# Patient Record
Sex: FEMALE | Race: WHITE | Employment: FULL TIME | URBAN - METROPOLITAN AREA
[De-identification: names, ages, dates, MRNs, and addresses within clinical notes are randomized per-mention and may not be internally consistent; named-entity substitution may affect disease eponyms.]

---

## 2024-01-10 ENCOUNTER — HOSPITAL ENCOUNTER (OUTPATIENT)
Age: 54
Setting detail: SPECIMEN
Discharge: HOME | End: 2024-01-10

## 2024-01-10 LAB
ALBUMIN SERPL-MCNC: 4.2 G/DL (ref 3.5–5.2)
ALBUMIN/GLOB SERPL: 1.6 {RATIO} (ref 1–2.5)
ALP SERPL-CCNC: 36 U/L (ref 35–104)
ALT SERPL-CCNC: 15 U/L (ref 5–33)
ANION GAP SERPL CALCULATED.3IONS-SCNC: 10 MMOL/L (ref 9–17)
AST SERPL-CCNC: 16 U/L
BASOPHILS # BLD: 0.05 K/UL (ref 0–0.2)
BASOPHILS NFR BLD: 1 % (ref 0–2)
BILIRUB SERPL-MCNC: 0.4 MG/DL (ref 0.3–1.2)
BUN SERPL-MCNC: 8 MG/DL (ref 6–20)
CALCIUM SERPL-MCNC: 9.1 MG/DL (ref 8.6–10.4)
CHLORIDE SERPL-SCNC: 101 MMOL/L (ref 98–107)
CHOLEST SERPL-MCNC: 217 MG/DL
CHOLESTEROL/HDL RATIO: 3.6
CO2 SERPL-SCNC: 25 MMOL/L (ref 20–31)
CREAT SERPL-MCNC: 0.5 MG/DL (ref 0.5–0.9)
EOSINOPHIL # BLD: 0.26 K/UL (ref 0–0.44)
EOSINOPHILS RELATIVE PERCENT: 4 % (ref 1–4)
ERYTHROCYTE [DISTWIDTH] IN BLOOD BY AUTOMATED COUNT: 12 % (ref 11.8–14.4)
EST. AVERAGE GLUCOSE BLD GHB EST-MCNC: 88 MG/DL
GFR SERPL CREATININE-BSD FRML MDRD: >60 ML/MIN/1.73M2
GLUCOSE SERPL-MCNC: 76 MG/DL (ref 70–99)
HBA1C MFR BLD: 4.7 % (ref 4–6)
HCT VFR BLD AUTO: 41.4 % (ref 36.3–47.1)
HDLC SERPL-MCNC: 60 MG/DL
HGB BLD-MCNC: 14 G/DL (ref 11.9–15.1)
IMM GRANULOCYTES # BLD AUTO: <0.03 K/UL (ref 0–0.3)
IMM GRANULOCYTES NFR BLD: 0 %
LDLC SERPL CALC-MCNC: 142 MG/DL (ref 0–130)
LYMPHOCYTES NFR BLD: 1.89 K/UL (ref 1.1–3.7)
LYMPHOCYTES RELATIVE PERCENT: 25 % (ref 24–43)
MCH RBC QN AUTO: 31 PG (ref 25.2–33.5)
MCHC RBC AUTO-ENTMCNC: 33.8 G/DL (ref 28.4–34.8)
MCV RBC AUTO: 91.8 FL (ref 82.6–102.9)
MONOCYTES NFR BLD: 0.73 K/UL (ref 0.1–1.2)
MONOCYTES NFR BLD: 10 % (ref 3–12)
NEUTROPHILS NFR BLD: 60 % (ref 36–65)
NEUTS SEG NFR BLD: 4.49 K/UL (ref 1.5–8.1)
NRBC BLD-RTO: 0 PER 100 WBC
PLATELET # BLD AUTO: 283 K/UL (ref 138–453)
PMV BLD AUTO: 9.8 FL (ref 8.1–13.5)
POTASSIUM SERPL-SCNC: 4.1 MMOL/L (ref 3.7–5.3)
PROT SERPL-MCNC: 6.9 G/DL (ref 6.4–8.3)
RBC # BLD AUTO: 4.51 M/UL (ref 3.95–5.11)
SODIUM SERPL-SCNC: 136 MMOL/L (ref 135–144)
TRIGL SERPL-MCNC: 76 MG/DL
TSH SERPL DL<=0.05 MIU/L-ACNC: 1.41 UIU/ML (ref 0.3–5)
WBC OTHER # BLD: 7.4 K/UL (ref 3.5–11.3)

## 2024-07-16 ENCOUNTER — APPOINTMENT (OUTPATIENT)
Dept: GENERAL RADIOLOGY | Age: 54
End: 2024-07-16
Payer: COMMERCIAL

## 2024-07-16 ENCOUNTER — HOSPITAL ENCOUNTER (EMERGENCY)
Age: 54
Discharge: HOME | End: 2024-07-16
Attending: EMERGENCY MEDICINE
Payer: COMMERCIAL

## 2024-07-16 VITALS
WEIGHT: 220.46 LBS | BODY MASS INDEX: 36.73 KG/M2 | DIASTOLIC BLOOD PRESSURE: 107 MMHG | RESPIRATION RATE: 18 BRPM | HEART RATE: 60 BPM | TEMPERATURE: 98.3 F | HEIGHT: 65 IN | OXYGEN SATURATION: 97 % | SYSTOLIC BLOOD PRESSURE: 188 MMHG

## 2024-07-16 DIAGNOSIS — S93.402A SPRAIN OF LEFT ANKLE, UNSPECIFIED LIGAMENT, INITIAL ENCOUNTER: Primary | ICD-10-CM

## 2024-07-16 PROCEDURE — 73590 X-RAY EXAM OF LOWER LEG: CPT

## 2024-07-16 PROCEDURE — 99283 EMERGENCY DEPT VISIT LOW MDM: CPT

## 2024-07-16 PROCEDURE — 73630 X-RAY EXAM OF FOOT: CPT

## 2024-07-16 PROCEDURE — 6370000000 HC RX 637 (ALT 250 FOR IP): Performed by: EMERGENCY MEDICINE

## 2024-07-16 RX ORDER — HYDROCODONE BITARTRATE AND ACETAMINOPHEN 5; 325 MG/1; MG/1
1 TABLET ORAL ONCE
Status: COMPLETED | OUTPATIENT
Start: 2024-07-16 | End: 2024-07-16

## 2024-07-16 RX ORDER — HYDROCODONE BITARTRATE AND ACETAMINOPHEN 5; 325 MG/1; MG/1
1 TABLET ORAL EVERY 6 HOURS PRN
Qty: 12 TABLET | Refills: 0 | Status: SHIPPED | OUTPATIENT
Start: 2024-07-16 | End: 2024-07-19

## 2024-07-16 RX ADMIN — HYDROCODONE BITARTRATE AND ACETAMINOPHEN 1 TABLET: 5; 325 TABLET ORAL at 18:09

## 2024-07-16 ASSESSMENT — LIFESTYLE VARIABLES
HOW OFTEN DO YOU HAVE A DRINK CONTAINING ALCOHOL: 2-4 TIMES A MONTH
HOW MANY STANDARD DRINKS CONTAINING ALCOHOL DO YOU HAVE ON A TYPICAL DAY: 1 OR 2

## 2024-07-16 ASSESSMENT — PAIN SCALES - GENERAL
PAINLEVEL_OUTOF10: 6
PAINLEVEL_OUTOF10: 4

## 2024-07-16 ASSESSMENT — PAIN DESCRIPTION - LOCATION: LOCATION: LEG

## 2024-07-16 ASSESSMENT — PAIN DESCRIPTION - PAIN TYPE: TYPE: ACUTE PAIN

## 2024-07-16 ASSESSMENT — PAIN DESCRIPTION - DESCRIPTORS: DESCRIPTORS: ACHING

## 2024-07-16 ASSESSMENT — PAIN DESCRIPTION - ORIENTATION: ORIENTATION: LEFT

## 2024-07-16 ASSESSMENT — PAIN - FUNCTIONAL ASSESSMENT: PAIN_FUNCTIONAL_ASSESSMENT: 0-10

## 2024-07-16 NOTE — DISCHARGE INSTRUCTIONS
Rest, ice, and elevate the leg.  Use the Aircast and crutches at all times.  Follow-up with the orthopedic surgeon in the morning for reevaluation.  Return to the emergency department with any problems or concerns as discussed.

## 2024-07-16 NOTE — ED PROVIDER NOTES
patient understands that at this time there is no evidence for a more malignant underlying process, but also understands that early in the process of an illness or injury, an emergency department workup can be falsely reassuring.  Routine discharge counseling was given, and it is understood that worsening, changing or persistent symptoms should prompt an immediate call or follow up with their primary physician or return to the emergency department. The importance of appropriate follow up was also discussed.  I have reviewed the disposition diagnosis.  I have answered the questions and given discharge instructions.  There was voiced understanding of these instructions and no further questions or complaints.    FINAL IMPRESSION      1. Sprain of left ankle, unspecified ligament, initial encounter          DISPOSITION/PLAN   DISPOSITION Decision To Discharge 07/16/2024 06:55:48 PM        CONDITION ON DISPOSITION: STABLE       PATIENT REFERRED TO:  Fiorella Youssef MD  [address]  [facility] [address]  [facility] [address]  [phone]    Call in 1 day        DISCHARGE MEDICATIONS:  Discharge Medication List as of 7/16/2024  7:02 PM        START taking these medications    Details   HYDROcodone-acetaminophen (NORCO) 5-325 MG per tablet Take 1 tablet by mouth every 6 hours as needed for Pain for up to 3 days. Intended supply: 3 days. Take lowest dose possible to manage pain Max Daily Amount: 4 tablets, Disp-12 tablet, R-0Normal             (Please note that portions of this note were completed with a voice recognition program.  Efforts were made to edit the dictations but occasionally words are mis-transcribed.  Additionally, portions of this note may also include information that was incorporated after care transfer to another provider that were not available at the time of my evaluation.  Some of this information could likely include laboratory values, vital sign updates, medications etc.)    Micki Lui DO   Attending

## 2024-07-18 ENCOUNTER — OFFICE VISIT (OUTPATIENT)
Dept: ORTHOPEDIC SURGERY | Age: 54
End: 2024-07-18
Payer: COMMERCIAL

## 2024-07-18 VITALS — BODY MASS INDEX: 36.65 KG/M2 | HEIGHT: 65 IN | WEIGHT: 220 LBS

## 2024-07-18 DIAGNOSIS — S93.492A SPRAIN OF ANTERIOR TALOFIBULAR LIGAMENT OF LEFT ANKLE, INITIAL ENCOUNTER: Primary | ICD-10-CM

## 2024-07-18 PROCEDURE — 1036F TOBACCO NON-USER: CPT | Performed by: PHYSICIAN ASSISTANT

## 2024-07-18 PROCEDURE — G8417 CALC BMI ABV UP PARAM F/U: HCPCS | Performed by: PHYSICIAN ASSISTANT

## 2024-07-18 PROCEDURE — 3017F COLORECTAL CA SCREEN DOC REV: CPT | Performed by: PHYSICIAN ASSISTANT

## 2024-07-18 PROCEDURE — 99203 OFFICE O/P NEW LOW 30 MIN: CPT | Performed by: PHYSICIAN ASSISTANT

## 2024-07-18 PROCEDURE — G8427 DOCREV CUR MEDS BY ELIG CLIN: HCPCS | Performed by: PHYSICIAN ASSISTANT

## 2024-07-18 RX ORDER — ALBUTEROL SULFATE 90 UG/1
2 AEROSOL, METERED RESPIRATORY (INHALATION) EVERY 4 HOURS PRN
COMMUNITY
Start: 2022-08-02

## 2024-07-18 NOTE — PROGRESS NOTES
Patient ID: Bobbi Child is a 53 y.o. female    Chief Compliant:  Chief Complaint   Patient presents with    Foot Injury     left      HPI:  This is a 53 y.o. female who presents to the clinic today for evaluation of left leg pain and acute ankle sprain and swelling.  Patient fell down about 2 steps when she was coming out of her RV 4 days ago.  Patient sustained a inversion injury she did go to the urgent care initially when this happened she had x-rays of the ankle that were negative she then went to the emergency room 2 days later as she was experiencing significant swelling and pain in the foot that radiated up to her anterior mid tibia.  She states that she has been had a hard time placing weight on her left ankle.  She has been taking ibuprofen and Tylenol as well as taking the Norco that she was given in the emergency room with minimal relief.  She has been elevating the leg as well as applying ice which does seem to help.  She denies any radiculopathy, paresthesias or weakness of her leg..       Review of Systems   All other systems reviewed and are negative.    Past History:    Current Outpatient Medications:     albuterol sulfate HFA (PROVENTIL;VENTOLIN;PROAIR) 108 (90 Base) MCG/ACT inhaler, Inhale 2 puffs into the lungs every 4 hours as needed, Disp: , Rfl:     HYDROcodone-acetaminophen (NORCO) 5-325 MG per tablet, Take 1 tablet by mouth every 6 hours as needed for Pain for up to 3 days. Intended supply: 3 days. Take lowest dose possible to manage pain Max Daily Amount: 4 tablets, Disp: 12 tablet, Rfl: 0  No Known Allergies  Social History     Socioeconomic History    Marital status:      Spouse name: Not on file    Number of children: Not on file    Years of education: Not on file    Highest education level: Not on file   Occupational History    Not on file   Tobacco Use    Smoking status: Never    Smokeless tobacco: Never   Substance and Sexual Activity    Alcohol use: Not on file    Drug

## 2024-08-01 ENCOUNTER — OFFICE VISIT (OUTPATIENT)
Dept: ORTHOPEDIC SURGERY | Age: 54
End: 2024-08-01
Payer: COMMERCIAL

## 2024-08-01 VITALS — BODY MASS INDEX: 36.65 KG/M2 | HEIGHT: 65 IN | WEIGHT: 220 LBS

## 2024-08-01 DIAGNOSIS — S93.492A SPRAIN OF ANTERIOR TALOFIBULAR LIGAMENT OF LEFT ANKLE, INITIAL ENCOUNTER: Primary | ICD-10-CM

## 2024-08-01 PROCEDURE — G8417 CALC BMI ABV UP PARAM F/U: HCPCS | Performed by: PHYSICIAN ASSISTANT

## 2024-08-01 PROCEDURE — 3017F COLORECTAL CA SCREEN DOC REV: CPT | Performed by: PHYSICIAN ASSISTANT

## 2024-08-01 PROCEDURE — G8427 DOCREV CUR MEDS BY ELIG CLIN: HCPCS | Performed by: PHYSICIAN ASSISTANT

## 2024-08-01 PROCEDURE — 1036F TOBACCO NON-USER: CPT | Performed by: PHYSICIAN ASSISTANT

## 2024-08-01 PROCEDURE — 99213 OFFICE O/P EST LOW 20 MIN: CPT | Performed by: PHYSICIAN ASSISTANT

## 2024-08-01 RX ORDER — METHYLPREDNISOLONE 4 MG/1
4 TABLET ORAL SEE ADMIN INSTRUCTIONS
Qty: 1 KIT | Refills: 0 | Status: SHIPPED | OUTPATIENT
Start: 2024-08-01 | End: 2024-08-07

## 2024-08-01 RX ORDER — IBUPROFEN 200 MG
200 TABLET ORAL EVERY 6 HOURS PRN
COMMUNITY

## 2024-08-01 RX ORDER — ACETAMINOPHEN 500 MG
500 TABLET ORAL EVERY 6 HOURS PRN
COMMUNITY

## 2024-08-01 NOTE — PROGRESS NOTES
Patient ID: Bobbi Child is a 53 y.o. female    Chief Compliant:  Chief Complaint   Patient presents with    Ankle Injury     left      HPI:  This is a 53 y.o. female who presents to the clinic today for follow-up of acute ankle sprain.  Patient was last seen by me on 7/18/2020 for where we gave her a cam boot for a high-grade ankle sprain.  She notes that her swelling has gone down and her pain has improved slightly she still unable to completely bear weight on that foot at this time she does note 3 middle toes are slightly numb she still does have some swelling going on to that foot she has been icing as well as elevating her foot daily taking Motrin and Tylenol..       Review of Systems   All other systems reviewed and are negative.    Past History:    Current Outpatient Medications:     acetaminophen (TYLENOL) 500 MG tablet, Take 1 tablet by mouth every 6 hours as needed for Pain, Disp: , Rfl:     ibuprofen (ADVIL;MOTRIN) 200 MG tablet, Take 1 tablet by mouth every 6 hours as needed for Pain, Disp: , Rfl:     albuterol sulfate HFA (PROVENTIL;VENTOLIN;PROAIR) 108 (90 Base) MCG/ACT inhaler, Inhale 2 puffs into the lungs every 4 hours as needed, Disp: , Rfl:   No Known Allergies  Social History     Socioeconomic History    Marital status:      Spouse name: Not on file    Number of children: Not on file    Years of education: Not on file    Highest education level: Not on file   Occupational History    Not on file   Tobacco Use    Smoking status: Never    Smokeless tobacco: Never   Substance and Sexual Activity    Alcohol use: Not on file    Drug use: Not on file    Sexual activity: Not on file   Other Topics Concern    Not on file   Social History Narrative    Not on file     Social Determinants of Health     Financial Resource Strain: Not on file   Food Insecurity: Not on file   Transportation Needs: Not on file   Physical Activity: Not on file   Stress: Not on file   Social Connections: Not on file

## 2024-08-07 ENCOUNTER — TELEPHONE (OUTPATIENT)
Dept: FAMILY MEDICINE CLINIC | Age: 54
End: 2024-08-07

## 2024-08-07 NOTE — TELEPHONE ENCOUNTER
Patient came into office to drop off FMLA/STD paperwork to be completed by provider.  Paperwork has been scanned into  as well as original copy placed in provider folder.  Patient also filled out RENE and that has been scanned into .    Please fax completed forms over when complete.

## 2024-08-16 ENCOUNTER — OFFICE VISIT (OUTPATIENT)
Dept: ORTHOPEDIC SURGERY | Age: 54
End: 2024-08-16
Payer: COMMERCIAL

## 2024-08-16 VITALS — HEIGHT: 65 IN | WEIGHT: 220.02 LBS | BODY MASS INDEX: 36.66 KG/M2 | RESPIRATION RATE: 14 BRPM

## 2024-08-16 DIAGNOSIS — S93.492A SPRAIN OF ANTERIOR TALOFIBULAR LIGAMENT OF LEFT ANKLE, INITIAL ENCOUNTER: Primary | ICD-10-CM

## 2024-08-16 PROCEDURE — G8417 CALC BMI ABV UP PARAM F/U: HCPCS | Performed by: PHYSICIAN ASSISTANT

## 2024-08-16 PROCEDURE — 99213 OFFICE O/P EST LOW 20 MIN: CPT | Performed by: PHYSICIAN ASSISTANT

## 2024-08-16 PROCEDURE — G8427 DOCREV CUR MEDS BY ELIG CLIN: HCPCS | Performed by: PHYSICIAN ASSISTANT

## 2024-08-16 PROCEDURE — 3017F COLORECTAL CA SCREEN DOC REV: CPT | Performed by: PHYSICIAN ASSISTANT

## 2024-08-16 PROCEDURE — 1036F TOBACCO NON-USER: CPT | Performed by: PHYSICIAN ASSISTANT

## 2024-08-16 NOTE — PROGRESS NOTES
Patient ID: Bobbi Child is a 53 y.o. female    Chief Compliant:  Chief Complaint   Patient presents with    Ankle Pain     Left      HPI:  This is a 53 y.o. female who presents to the clinic today for ongoing left ankle pain after she sustained a fall 6 weeks ago.  Patient states that she continues to do better each week she did get a Medrol Dosepak given to her at her last office visit that states that she did find significant relief.  She also notes that the past couple days she has been able to take a few more steps on that ankle and feels like her progress has significantly improved.  Patient still denies any numbness or tingling she states that that sensation that she experienced on her last visit has resolved.  She notes that the swelling is significantly resolved as well..       Review of Systems   All other systems reviewed and are negative.    Past History:    Current Outpatient Medications:     acetaminophen (TYLENOL) 500 MG tablet, Take 1 tablet by mouth every 6 hours as needed for Pain, Disp: , Rfl:     ibuprofen (ADVIL;MOTRIN) 200 MG tablet, Take 1 tablet by mouth every 6 hours as needed for Pain, Disp: , Rfl:     albuterol sulfate HFA (PROVENTIL;VENTOLIN;PROAIR) 108 (90 Base) MCG/ACT inhaler, Inhale 2 puffs into the lungs every 4 hours as needed, Disp: , Rfl:   No Known Allergies  Social History     Socioeconomic History    Marital status:      Spouse name: Not on file    Number of children: Not on file    Years of education: Not on file    Highest education level: Not on file   Occupational History    Not on file   Tobacco Use    Smoking status: Never    Smokeless tobacco: Never   Substance and Sexual Activity    Alcohol use: Not on file    Drug use: Not on file    Sexual activity: Not on file   Other Topics Concern    Not on file   Social History Narrative    Not on file     Social Determinants of Health     Financial Resource Strain: Not on file   Food Insecurity: No Food Insecurity

## 2024-08-26 ENCOUNTER — TELEPHONE (OUTPATIENT)
Dept: ORTHOPEDIC SURGERY | Age: 54
End: 2024-08-26

## 2024-08-26 NOTE — TELEPHONE ENCOUNTER
Patient is calling to see if you received any paperwork from [employer] for her disability extension.  It was faxed on 8/19/24 and today.  Please return her call to let her know.  Thank.  It needs to be completed by 8/29/24,

## 2024-08-27 NOTE — TELEPHONE ENCOUNTER
Received request from Lory requesting office note and applicable test results from 08/17/2024 and ongoing.     Writer spoke with patient and informed request received. Writer verified with patient that Lory would like last office note from 08/16/2024 as that is when her leave was extended. Writer printed last office note and letter with extension date as requested and faxed to [phone]. Patient was notified and verbalized understanding.

## 2024-08-27 NOTE — TELEPHONE ENCOUNTER
Called patient for clarification regarding paperwork. Patient stated Lory is faxing over a request for more documentation since last office visit. Paperwork is due 08/29/2024. Writer informed will verify request from Lory received and will work on documentation for patient. Patient verbalized understanding.

## 2024-09-05 ENCOUNTER — PATIENT MESSAGE (OUTPATIENT)
Dept: ORTHOPEDIC SURGERY | Age: 54
End: 2024-09-05

## 2024-09-05 DIAGNOSIS — S93.492A SPRAIN OF ANTERIOR TALOFIBULAR LIGAMENT OF LEFT ANKLE, INITIAL ENCOUNTER: Primary | ICD-10-CM

## 2024-09-05 RX ORDER — METHYLPREDNISOLONE 4 MG
4 TABLET, DOSE PACK ORAL SEE ADMIN INSTRUCTIONS
Qty: 1 KIT | Refills: 0 | Status: SHIPPED | OUTPATIENT
Start: 2024-09-05 | End: 2024-09-11

## 2024-09-05 NOTE — TELEPHONE ENCOUNTER
Rosalia,     Please see message below and advise.  You prescribe patient a medrol dosepack on 8/1/24 for patient's left ankle sprain. Last office visit in 8/16/24 you mentioned that the medrol dosepack provided patient with significant relief.

## 2024-09-13 ENCOUNTER — OFFICE VISIT (OUTPATIENT)
Dept: ORTHOPEDIC SURGERY | Age: 54
End: 2024-09-13
Payer: COMMERCIAL

## 2024-09-13 VITALS — RESPIRATION RATE: 14 BRPM | HEIGHT: 65 IN | BODY MASS INDEX: 36.65 KG/M2 | WEIGHT: 220 LBS

## 2024-09-13 DIAGNOSIS — S93.492A SPRAIN OF ANTERIOR TALOFIBULAR LIGAMENT OF LEFT ANKLE, INITIAL ENCOUNTER: Primary | ICD-10-CM

## 2024-09-13 PROCEDURE — G8427 DOCREV CUR MEDS BY ELIG CLIN: HCPCS | Performed by: PHYSICIAN ASSISTANT

## 2024-09-13 PROCEDURE — G8417 CALC BMI ABV UP PARAM F/U: HCPCS | Performed by: PHYSICIAN ASSISTANT

## 2024-09-13 PROCEDURE — 3017F COLORECTAL CA SCREEN DOC REV: CPT | Performed by: PHYSICIAN ASSISTANT

## 2024-09-13 PROCEDURE — 99213 OFFICE O/P EST LOW 20 MIN: CPT | Performed by: PHYSICIAN ASSISTANT

## 2024-09-13 PROCEDURE — 1036F TOBACCO NON-USER: CPT | Performed by: PHYSICIAN ASSISTANT

## 2024-09-20 ENCOUNTER — HOSPITAL ENCOUNTER (OUTPATIENT)
Dept: PHYSICAL THERAPY | Facility: CLINIC | Age: 54
Setting detail: THERAPIES SERIES
Discharge: HOME | End: 2024-09-20
Attending: PHYSICIAN ASSISTANT
Payer: COMMERCIAL

## 2024-09-20 PROCEDURE — 97110 THERAPEUTIC EXERCISES: CPT

## 2024-09-20 PROCEDURE — 97161 PT EVAL LOW COMPLEX 20 MIN: CPT

## 2024-09-20 PROCEDURE — 97016 VASOPNEUMATIC DEVICE THERAPY: CPT

## 2024-09-24 ENCOUNTER — HOSPITAL ENCOUNTER (OUTPATIENT)
Dept: PHYSICAL THERAPY | Facility: CLINIC | Age: 54
Setting detail: THERAPIES SERIES
Discharge: HOME | End: 2024-09-24
Attending: PHYSICIAN ASSISTANT
Payer: COMMERCIAL

## 2024-09-24 PROCEDURE — 97110 THERAPEUTIC EXERCISES: CPT

## 2024-09-24 PROCEDURE — 97016 VASOPNEUMATIC DEVICE THERAPY: CPT

## 2024-09-26 ENCOUNTER — HOSPITAL ENCOUNTER (OUTPATIENT)
Dept: PHYSICAL THERAPY | Facility: CLINIC | Age: 54
Setting detail: THERAPIES SERIES
Discharge: HOME | End: 2024-09-26
Attending: PHYSICIAN ASSISTANT
Payer: COMMERCIAL

## 2024-09-26 PROCEDURE — 97110 THERAPEUTIC EXERCISES: CPT

## 2024-09-26 PROCEDURE — 97016 VASOPNEUMATIC DEVICE THERAPY: CPT

## 2024-09-30 ENCOUNTER — HOSPITAL ENCOUNTER (OUTPATIENT)
Dept: PHYSICAL THERAPY | Facility: CLINIC | Age: 54
Setting detail: THERAPIES SERIES
Discharge: HOME | End: 2024-09-30
Attending: PHYSICIAN ASSISTANT
Payer: COMMERCIAL

## 2024-09-30 PROCEDURE — 97110 THERAPEUTIC EXERCISES: CPT

## 2024-09-30 PROCEDURE — 97140 MANUAL THERAPY 1/> REGIONS: CPT

## 2024-09-30 PROCEDURE — 97016 VASOPNEUMATIC DEVICE THERAPY: CPT

## 2024-09-30 NOTE — FLOWSHEET NOTE
[] [facility]  Outpatient Rehabilitation &  Therapy  [address]  [phone]  [phone] [] [facility]  Outpatient Rehabilitation &  Therapy  [address]  P: [phone]  F: [phone] [x] [facility]  Outpatient Rehabilitation &  Therapy  [address]  P: [phone]  F: [phone] [] [facility]  Outpatient Rehabilitation &  Therapy  [address]  [phone]  [phone] [] [facility]  Outpatient Rehabilitation &  Therapy  [address]   P: [phone]  F: [phone]  [] [facility]  Outpatient Rehabilitation &  Therapy  [address]  P: [phone]  F: [phone] [] [facility]  Outpatient Rehabilitation &  Therapy  [address]  P: [phone]  F: [phone] [] [facility]  Outpatient Rehabilitation &  Therapy  [address]  P: [phone]  F: [phone] [] [facility]  Outpatient Rehabilitation &  Therapy  [address]  P: [phone]  F: [phone]  [] [facility] Outpatient Rehabilitation &  Therapy  [address]  P: [phone]  F: [phone]     Physical Therapy Daily Treatment Note    Date:  2024  Patient Name:  Bobbi Child    :  1970  MRN: [id number]  Physician:     Rosalia Harmon PA   Insurance: [facility] Choice Plus; Av[employer] yr; 100/100vs; no auth req; hard max; PT/OT/ST comb; 20% coins; 450/294.38 remaining ded; 6600/5959.80 remaining OOP   Medical Diagnosis:     S93.492A (ICD-10-CM) - Sprain of anterior talofibular ligament of left ankle, initial encounter   Rehab Codes: M25.572- Left ankle pain  Onset date: 2024                         Next Dr's appt.: 10/24/2024  Visit# / total visits:      Cancels/No Shows: 0    Subjective:  Pt has been compliant with HEP.

## 2024-10-03 ENCOUNTER — HOSPITAL ENCOUNTER (OUTPATIENT)
Dept: PHYSICAL THERAPY | Facility: CLINIC | Age: 54
Setting detail: THERAPIES SERIES
Discharge: HOME | End: 2024-10-03
Attending: PHYSICIAN ASSISTANT
Payer: COMMERCIAL

## 2024-10-03 PROCEDURE — 97110 THERAPEUTIC EXERCISES: CPT

## 2024-10-03 PROCEDURE — 97140 MANUAL THERAPY 1/> REGIONS: CPT

## 2024-10-03 PROCEDURE — 97016 VASOPNEUMATIC DEVICE THERAPY: CPT

## 2024-10-03 NOTE — FLOWSHEET NOTE
[x] [facility]  Outpatient Rehabilitation &  Therapy  [address]  P: [phone]  F: [phone]     Physical Therapy Daily Treatment Note    Date:  10/3/2024  Patient Name:  Bobbi Child    :  1970  MRN: [id number]  Physician:     Rosalia Harmon PA   Insurance: [facility] Choice Plus; Avni yr; 100/100vs; no auth req; hard max; PT/OT/ST comb; 20% coins; 450/294.38 remaining ded; 6600/5959.80 remaining OOP   Medical Diagnosis:     S93.492A (ICD-10-CM) - Sprain of anterior talofibular ligament of left ankle, initial encounter   Rehab Codes: M25.572- Left ankle pain  Onset date: 2024                         Next Dr's appt.: 10/24/2024  Visit# / total visits:      Cancels/No Shows: 0    Subjective:  Pt reports that her pain and swelling has slightly decreased, but she was sore after walking at the store the other day.  Pain:  [x] Yes  [] No Location: L ankle Pain Rating: (0-10 scale) 3/10  Pain altered Tx:  [x] No  [] Yes  Action:  Comments:   Objective:  Modalities: Vaso device; 34 deg; 15'; low compression  Precautions: Highly irritable (L) ankle pain; ATFL sprain; decreased LLE WB  Exercises:  Exercise Performed Reps/ Time Weight/ Level Comments   Ankle ABC x 2x   A-Z   Ankle circles x 2x15  Cw/ccw   Ankle AROM x 3x10; 5\"     Clamshell x x30  [place] TB Bilat   SL hip abduction x 3x10   Bilat; pt hand on hip   Toe Yoga x 2x10   Bilat   Foot doming x 2x10, 5\"     Towel calf stretch          Seated BAPS  2x10 L1 PF/DF, inv/ev, cw/ccw   Tband ankle PF/DF x x30 orange    TB ankle inv/ev x x20 orange    Isometric inv/ev  10x5\"     Seated self DF stretch x 4x30\"  Towel assist   Other:    Manual therapy:   10'   STM to anterior ankle and calf musculature; strokes directed proximally to assist in edema reduction; pt shows visible reduction in post-injury edema with completion of manual therapy.      Specific Instructions for next treatment: Continue with foot intrinsic and hip

## 2024-10-07 ENCOUNTER — HOSPITAL ENCOUNTER (OUTPATIENT)
Dept: PHYSICAL THERAPY | Facility: CLINIC | Age: 54
Setting detail: THERAPIES SERIES
Discharge: HOME | End: 2024-10-07
Attending: PHYSICIAN ASSISTANT
Payer: COMMERCIAL

## 2024-10-07 PROCEDURE — 97110 THERAPEUTIC EXERCISES: CPT

## 2024-10-07 PROCEDURE — 97140 MANUAL THERAPY 1/> REGIONS: CPT

## 2024-10-07 PROCEDURE — 97016 VASOPNEUMATIC DEVICE THERAPY: CPT

## 2024-10-07 NOTE — FLOWSHEET NOTE
strength/endurance exercises.  End with vaso for pain modulation.  Continue with manual for edema and pain management prn.  Increase resistance with bike next session.  Standing exercises next week.    Treatment Charges: Mins Units   []  Modalities     [x]  Ther Exercise 35 2   [x]  Manual Therapy 10 1   []  Ther Activities     []  Neuro Re-ed     [x]  Vasocompression 10 1   [] Gait     []  Other     Total Billable time 55 4       Assessment: [x] Progressing toward goals. Introduced upright bike warmup to increase tissue perfusion and extensibility and add light AROM.  Continued with manual therapy as outlined in objective section with a focus on edema and pain management.  Continued with hooklying ankle AROM exercises; the pt demonstrates increased AROM and reduced pain with motion in all planes.  Continued with resisted ankle AROM focusing on inv/eversion and PF/DF.  Pt demonstrated improved tolerance to range and endurance with exercises.  Continued with progressions of SL hip strength and endurance adding band resistance with SL hip abduction; pt demonstrates increased endurance and strength overall.  Finished with foot intrinsic exercises followed by vasocompression for pain and edema modulation.  Will continued to progress with a focus on improving ROM and strength while improving functional tolerance to standing activity.      [] No change.     [] Other:  [x] Patient would continue to benefit from skilled physical therapy services in order to: meet goals listed below  STG: (to be met in 8 treatments)  ? Pain: Pt will report a decrease in pain to a a level of 1-2/10 in order to improve tolerance to functional activity  ? ROM: Pt will improve (L) ankle AROM to 100% of expected range in order to improve gait mechanics  ? Strength: Pt will grossly improve LE strength bilaterally in order to improve tolerance to lifting objects  ? Function: Pt will ambulate with normalized gait pattern  Patient to be independent

## 2024-10-10 ENCOUNTER — HOSPITAL ENCOUNTER (OUTPATIENT)
Dept: PHYSICAL THERAPY | Facility: CLINIC | Age: 54
Setting detail: THERAPIES SERIES
Discharge: HOME | End: 2024-10-10
Attending: PHYSICIAN ASSISTANT
Payer: COMMERCIAL

## 2024-10-10 NOTE — FLOWSHEET NOTE
[x] [facility]  Outpatient Rehabilitation &  Therapy  [address]  P: [phone]  F: [phone]     Therapy Cancel/No Show note    Date: 10/10/2024  Patient: Bobbi Child  : 1970  MRN: [id number]    Cancels/No Shows to date:     For today's appointment patient:    [x]  Cancelled    [] Rescheduled appointment    [] No-show     Reason given by patient:    []  Patient ill    []  Conflicting appointment    [] No transportation      [] Conflict with work    [] No reason given    [] Weather related    [] COVID-19    [x] Other:      Comments:  Pt called  to cancel appointment due to a family emergency.      [x] Next appointment was confirmed    Electronically signed by: HUA CASTRO, PT

## 2024-10-14 ENCOUNTER — HOSPITAL ENCOUNTER (OUTPATIENT)
Dept: PHYSICAL THERAPY | Facility: CLINIC | Age: 54
Setting detail: THERAPIES SERIES
Discharge: HOME | End: 2024-10-14
Attending: PHYSICIAN ASSISTANT
Payer: COMMERCIAL

## 2024-10-14 PROCEDURE — 97016 VASOPNEUMATIC DEVICE THERAPY: CPT

## 2024-10-14 PROCEDURE — 97110 THERAPEUTIC EXERCISES: CPT

## 2024-10-14 PROCEDURE — 97140 MANUAL THERAPY 1/> REGIONS: CPT

## 2024-10-14 NOTE — FLOWSHEET NOTE
[x] [facility]  Outpatient Rehabilitation &  Therapy  [address]  P: [phone]  F: [phone]     Physical Therapy Daily Treatment Note    Date:  10/14/2024  Patient Name:  Bobbi Child    :  1970  MRN: [id number]  Physician:     Rosalia Harmon PA   Insurance: [facility] Choice Plus; Avni yr; 100/100vs; no auth req; hard max; PT/OT/ST comb; 20% coins; 450/294.38 remaining ded; 6600/5959.80 remaining OOP   Medical Diagnosis:     S93.492A (ICD-10-CM) - Sprain of anterior talofibular ligament of left ankle, initial encounter   Rehab Codes: M25.572- Left ankle pain  Onset date: 2024                         Next Dr's appt.: 10/24/2024  Visit# / total visits:      Cancels/No Shows: 0    Subjective:  Pt reports that her pain is slightly increased after going to a [employer] concert in [place] this weekend.   Pain:  [x] Yes  [] No Location: L ankle Pain Rating: (0-10 scale) 3/10  Pain altered Tx:  [x] No  [] Yes  Action:  Comments:   Objective:  Modalities: Vaso device; 34 deg; 10'; low compression  Precautions: Highly irritable (L) ankle pain; ATFL sprain; decreased LLE WB  Exercises:  Exercise Performed Reps/ Time Weight/ Level Comments   Upright Bike x 8'  Seat 2   Ankle ABC x 2x   A-Z   Ankle circles x 2x15  Cw/ccw   Ankle AROM x 3x10; 5\"     Clamshell x x30 [place] TB Bilat   SL hip abduction x 3x10 Orange TB Bilat; pt hand on hip   Toe Yoga x 2x10   Bilat   Foot doming  2x10, 5\"     Towel calf stretch          Seated BAPS  2x10 L1 PF/DF, inv/ev, cw/ccw   Tband ankle PF/DF x x40 Yellow TB    TB ankle inv/ev x x20 Yellow TB    Isometric inv/ev  10x5\"     Seated self DF stretch x 4x30\"  Towel assist   Other:    Manual therapy:   10'   STM to anterior ankle and calf musculature; strokes directed proximally to assist in edema reduction; pt shows visible reduction in post-injury edema with completion of manual therapy.      Specific Instructions for next treatment: Continue

## 2024-10-17 ENCOUNTER — HOSPITAL ENCOUNTER (OUTPATIENT)
Dept: PHYSICAL THERAPY | Facility: CLINIC | Age: 54
Setting detail: THERAPIES SERIES
Discharge: HOME | End: 2024-10-17
Attending: PHYSICIAN ASSISTANT
Payer: COMMERCIAL

## 2024-10-17 PROCEDURE — 97016 VASOPNEUMATIC DEVICE THERAPY: CPT

## 2024-10-17 PROCEDURE — 97140 MANUAL THERAPY 1/> REGIONS: CPT

## 2024-10-17 PROCEDURE — 97110 THERAPEUTIC EXERCISES: CPT

## 2024-10-17 NOTE — FLOWSHEET NOTE
Verbalizes understanding.  [x] Demonstrates understanding.  [] Needs review.  [] Demonstrates/verbalizes HEP/Ed previously given.     Plan: [x] Continue current frequency toward long and short term goals.    [x] Specific Instructions for subsequent treatments: see above      Time In: 11:00 am           Time Out: 11:55 am    Electronically signed by:  HUA CASTRO PT

## 2024-10-21 ENCOUNTER — HOSPITAL ENCOUNTER (OUTPATIENT)
Dept: PHYSICAL THERAPY | Facility: CLINIC | Age: 54
Setting detail: THERAPIES SERIES
Discharge: HOME | End: 2024-10-21
Attending: PHYSICIAN ASSISTANT
Payer: COMMERCIAL

## 2024-10-21 PROCEDURE — 97016 VASOPNEUMATIC DEVICE THERAPY: CPT

## 2024-10-21 PROCEDURE — 97140 MANUAL THERAPY 1/> REGIONS: CPT

## 2024-10-21 PROCEDURE — 97110 THERAPEUTIC EXERCISES: CPT

## 2024-10-21 NOTE — FLOWSHEET NOTE
[x] [facility]  Outpatient Rehabilitation &  Therapy  [address]  P: [phone]  F: [phone]     Physical Therapy Daily Treatment Note    Date:  10/21/2024  Patient Name:  Bobbi Child    :  1970  MRN: [id number]  Physician:     Rosalia Harmon PA   Insurance: [facility] Choice Plus; Avni yr; 100/100vs; no auth req; hard max; PT/OT/ST comb; 20% coins; 450/294.38 remaining ded; 6600/5959.80 remaining OOP   Medical Diagnosis:     S93.492A (ICD-10-CM) - Sprain of anterior talofibular ligament of left ankle, initial encounter   Rehab Codes: M25.572- Left ankle pain  Onset date: 2024                         Next Dr's appt.: 10/24/2024  Visit# / total visits:      Cancels/No Shows: 0    Subjective:  Pt reports that her pain overall is improved and that she was able to tolerate most activities this past weekend with rest breaks as needed.   Pain:  [x] Yes  [] No Location: L ankle Pain Rating: (0-10 scale) 2/10  Pain altered Tx:  [x] No  [] Yes  Action:  Comments:   Objective:  Modalities: Vaso device; 34 deg; 10'; low compression  Precautions: Highly irritable (L) ankle pain; ATFL sprain; decreased LLE WB  Exercises:  Exercise Performed Reps/ Time Weight/ Level Comments   Upright Bike x 8'  Seat 3   Ankle ABC  2x   A-Z   Ankle circles  2x15  Cw/ccw   Ankle AROM x 3x10; 5\"     Clamshell x x30 [place] Bilat   SL hip abduction x 3x10 [place] Bilat; pt hand on hip   Glute Bridge x 2x10 [place] TB 3\" hold   Toe Yoga  2x10   Bilat   Foot doming  2x10, 5\"     Towel calf stretch          Seated BAPS  2x10 L1 PF/DF, inv/ev, cw/ccw   Tband ankle PF/DF  x40 Yellow TB    TB ankle inv/ev  x20 Yellow TB    Isometric inv/ev  10x5\"     Seated self DF stretch  4x30\"  Towel assist          Standing       Heel raise x 3x10     3-way kick x 2x10 ea     Total gym squat x x30  Lvl 18   Total gym single leg squat x x20  Lvl 18   Lateral band walk x 4x10 steps [place] TB           Other:    Manual

## 2024-10-22 ENCOUNTER — OFFICE VISIT (OUTPATIENT)
Dept: ORTHOPEDIC SURGERY | Age: 54
End: 2024-10-22
Payer: COMMERCIAL

## 2024-10-22 VITALS — BODY MASS INDEX: 36.65 KG/M2 | RESPIRATION RATE: 14 BRPM | WEIGHT: 220 LBS | HEIGHT: 65 IN

## 2024-10-22 DIAGNOSIS — S93.492A SPRAIN OF ANTERIOR TALOFIBULAR LIGAMENT OF LEFT ANKLE, INITIAL ENCOUNTER: Primary | ICD-10-CM

## 2024-10-22 PROCEDURE — 99213 OFFICE O/P EST LOW 20 MIN: CPT | Performed by: PHYSICIAN ASSISTANT

## 2024-10-22 PROCEDURE — 3017F COLORECTAL CA SCREEN DOC REV: CPT | Performed by: PHYSICIAN ASSISTANT

## 2024-10-22 PROCEDURE — G8484 FLU IMMUNIZE NO ADMIN: HCPCS | Performed by: PHYSICIAN ASSISTANT

## 2024-10-22 PROCEDURE — G8417 CALC BMI ABV UP PARAM F/U: HCPCS | Performed by: PHYSICIAN ASSISTANT

## 2024-10-22 PROCEDURE — 1036F TOBACCO NON-USER: CPT | Performed by: PHYSICIAN ASSISTANT

## 2024-10-22 PROCEDURE — G8428 CUR MEDS NOT DOCUMENT: HCPCS | Performed by: PHYSICIAN ASSISTANT

## 2024-10-22 NOTE — PROGRESS NOTES
Patient ID: Bobbi Child is a 53 y.o. female    Chief Compliant:  Chief Complaint   Patient presents with    Ankle Pain     Left Ankle Pain     HPI:  This is a 53 y.o. female who presents to the clinic today for 4 month follow up of left ankle sprain, DOI was 7/14/24.  Patient reports that she is ambulating well at this point she is 70% improved still gets occasional swelling but she does wear a lace up ankle brace.  Patient would like to return to work on Monday, 10/28/2024.  No other complaints at this time.      Review of Systems   All other systems reviewed and are negative.    Past History:    Current Outpatient Medications:     acetaminophen (TYLENOL) 500 MG tablet, Take 1 tablet by mouth every 6 hours as needed for Pain, Disp: , Rfl:     ibuprofen (ADVIL;MOTRIN) 200 MG tablet, Take 1 tablet by mouth every 6 hours as needed for Pain, Disp: , Rfl:     albuterol sulfate HFA (PROVENTIL;VENTOLIN;PROAIR) 108 (90 Base) MCG/ACT inhaler, Inhale 2 puffs into the lungs every 4 hours as needed, Disp: , Rfl:   No Known Allergies  Social History     Socioeconomic History    Marital status:      Spouse name: Not on file    Number of children: Not on file    Years of education: Not on file    Highest education level: Not on file   Occupational History    Not on file   Tobacco Use    Smoking status: Never    Smokeless tobacco: Never   Substance and Sexual Activity    Alcohol use: Not on file    Drug use: Not on file    Sexual activity: Not on file   Other Topics Concern    Not on file   Social History Narrative    Not on file     Social Determinants of Health     Financial Resource Strain: Not on file   Food Insecurity: No Food Insecurity (1/18/2024)    Received from [facility]    Hunger Screening     Within the past 12 months we worried whether our food would run out before we got money to buy more.: Never True     Within the past 12 months the food we bought just didn't

## 2024-10-28 ENCOUNTER — APPOINTMENT (OUTPATIENT)
Dept: PHYSICAL THERAPY | Facility: CLINIC | Age: 54
End: 2024-10-28
Attending: PHYSICIAN ASSISTANT
Payer: COMMERCIAL

## 2024-10-29 ENCOUNTER — HOSPITAL ENCOUNTER (OUTPATIENT)
Dept: PHYSICAL THERAPY | Facility: CLINIC | Age: 54
Setting detail: THERAPIES SERIES
Discharge: HOME | End: 2024-10-29
Attending: PHYSICIAN ASSISTANT
Payer: COMMERCIAL

## 2024-10-29 PROCEDURE — 97110 THERAPEUTIC EXERCISES: CPT

## 2024-10-29 PROCEDURE — 97140 MANUAL THERAPY 1/> REGIONS: CPT

## 2024-10-29 PROCEDURE — 97016 VASOPNEUMATIC DEVICE THERAPY: CPT

## 2024-10-29 NOTE — FLOWSHEET NOTE
[x] [facility]  Outpatient Rehabilitation &  Therapy  [address]  P: [phone]  F: [phone]     Physical Therapy Daily Treatment Note    Date:  10/29/2024  Patient Name:  Bobbi Child    :  1970  MRN: [id number]  Physician:     Rosalia Harmon PA   Insurance: [facility] [employer] Plus; [employer] yr; 100/100vs; no auth req; hard max; PT/OT/ST comb; 20% coins; 450/294.38 remaining ded; 6600/5959.80 remaining OOP   Medical Diagnosis:     S93.492A (ICD-10-CM) - Sprain of anterior talofibular ligament of left ankle, initial encounter   Rehab Codes: M25.572- Left ankle pain  Onset date: 2024                         Next Dr's appt.: 10/24/2024  Visit# / total visits: 10/16     Cancels/No Shows: 0    Subjective:    Pain:  [x] Yes  [] No Location: L ankle Pain Rating: (0-10 scale) 6/10  Pain altered Tx:  [] No  [x] Yes  Action: MFR  Comments: Pt went back to work yesterday and is now having increased pain and swelling. Arrives with antalgic gait.   Objective:  Modalities: Vaso device; 34 deg; 10'; low compression  Precautions: Highly irritable (L) ankle pain; ATFL sprain; decreased LLE WB  Exercises:  Exercise Performed Reps/ Time Weight/ Level Comments   Upright Bike  8'  Seat 3   Ankle ABC x 2x   A-Z   Ankle circles x 2x15  Cw/ccw   Ankle AROM x 2x10; 5\"     Clamshell  x30 LimeTB Bilat   SL hip abduction  3x10 LimeTB Bilat; pt hand on hip   Glute Bridge  2x10 Lime TB 3\" hold   Toe Yoga  2x10   Bilat   Foot doming  2x10, 5\"     Towel calf stretch          Seated BAPS  2x10 L1 PF/DF, inv/ev, cw/ccw   Tband ankle PF/DF  x40 Yellow TB    TB ankle inv/ev  x20 Yellow TB    Isometric inv/ev  10x5\"     Seated self DF stretch  4x30\"  Towel assist          Standing       Heel raise  3x10     3-way kick  2x10 ea     Total gym squat  x30  Lvl 18   Total gym single leg squat  x20  Lvl 18   Lateral band walk  4x10 steps [place] TB           Manual: MFR L lower lateral leg with pt in

## 2024-11-06 ENCOUNTER — HOSPITAL ENCOUNTER (OUTPATIENT)
Dept: PHYSICAL THERAPY | Facility: CLINIC | Age: 54
Setting detail: THERAPIES SERIES
Discharge: HOME | End: 2024-11-06
Attending: PHYSICIAN ASSISTANT

## 2024-11-06 NOTE — FLOWSHEET NOTE
[] [facility]  Outpatient Rehabilitation &  Therapy  [address]  [phone]  [phone] [] [facility]  Outpatient Rehabilitation &  Therapy  [address]  P: [phone]  F: [phone] [x] [facility]  Outpatient Rehabilitation &  Therapy  [address]  P: [phone]  F: [phone] [] [facility]  Outpatient Rehabilitation &  Therapy  [address]  [phone]  [phone] [] [facility]  Outpatient Rehabilitation &  Therapy  [address]   P: [phone]  F: [phone]  [] [facility]  Outpatient Rehabilitation &  Therapy  [address]  P: [phone]  F: [phone] [] [facility]  Outpatient Rehabilitation &  Therapy  [address]  P: [phone]  F: [phone] [] [facility]  Outpatient Rehabilitation &  Therapy  [address]  P: [phone]  F: [phone] [] [facility]  Outpatient Rehabilitation &  Therapy  [address]  P: [phone]  F: [phone]  [] [facility] Outpatient Rehabilitation &  Therapy  [address]  P: [phone]  F: [phone]     Therapy Cancel/No Show note    Date: 2024  Patient: Bobbi Child  : 1970  MRN: [id number]    Cancels/No Shows to date:     For today's appointment patient:    [x]  Cancelled    [] Rescheduled appointment    [] No-show     Reason given by patient:    []  Patient ill    []  Conflicting appointment    [] No transportation      [] Conflict with work    [x] No reason given    [] Weather related    [] COVID-19    [] Other:      Comments:        [x] Next appointment was confirmed    Electronically signed by: Keren Obando, PTA

## 2024-11-12 ENCOUNTER — HOSPITAL ENCOUNTER (OUTPATIENT)
Dept: PHYSICAL THERAPY | Facility: CLINIC | Age: 54
Setting detail: THERAPIES SERIES
Discharge: HOME | End: 2024-11-12
Attending: PHYSICIAN ASSISTANT
Payer: COMMERCIAL

## 2024-11-12 ENCOUNTER — CLINICAL DOCUMENTATION (OUTPATIENT)
Dept: PHYSICAL THERAPY | Facility: CLINIC | Age: 54
End: 2024-11-12

## 2024-11-12 PROCEDURE — 97110 THERAPEUTIC EXERCISES: CPT

## 2024-11-12 NOTE — DISCHARGE SUMMARY
bilaterally in order to improve tolerance to lifting objects. MET  ? Function: Pt will ambulate with normalized gait pattern. MET  Patient to be independent with home exercise program as demonstrated by performance with correct form without cues. MET  Demonstrate Knowledge of fall prevention. MET  LTG: (to be met in 16 treatments)  Pt will improve LEFS to atleast 35/80 (MCID=9) to demonstrate improved tolerance to functional activity. MET  Pt will ambulate community distances without use of CAM boot. 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 MET    Treatment to Date:  [x] Therapeutic Exercise    [] Modalities:  [] Therapeutic Activity    [] Ultrasound  [] Electrical Stimulation  [x] Gait Training     [] Massage       [] Lumbar/Cervical Traction  [] Neuromuscular Re-education [] Cold/hotpack [] Iontophoresis: 4 mg/mL  [x] Instruction in Home Exercise Program                     Dexamethasone Sodium  [x] Manual Therapy             Phosphate 40-80 mAmin  [] Aquatic Therapy                   [x] Vasocompression/    [] Other:             Game Ready    Discharge Status:     [x] Pt recovered from conditions. Treatment goals were met.    [] Pt received maximum benefit. No further therapy indicated at this time.    [x] Pt to continue exercise/home instructions independently.    [] Therapy interrupted due to:    [] Pt has 2 or more no shows/cancels, is discontinued per our policy.    [] Pt has completed prescribed number of treatment sessions.    [] Other:         Electronically signed by HUA CASTRO PT on 11/12/2024 at 4:34 PM      If you have any questions or concerns, please don't hesitate to call.  Thank you for your referral.

## 2024-11-12 NOTE — FLOWSHEET NOTE
x             Myotomes         Lumbar flexion         L2- Hip flexion 5 5     Lumbar extension         L3- Knee extension 5 5     Lumbar rotation         L4- Ankle DF 5 5     Lumbar lateral flexion         L5- EHL 5 5               S1- Ankle PF >3 >3               S2- Knee flexion 5 5     *Denotes pain with testing  Special tests positive for pain  MMT decreased due to pain and guarding    Functional Test:   LEFS Score (9/20): 26/80= 67.5% functionally impaired  LEFS Score (11/12): 56/80= 30% functionally impaired    Today's treatment:  Modalities: Vaso device; 34 deg; 10'; low compression (held)  Precautions: Highly irritable (L) ankle pain; ATFL sprain; decreased LLE WB  Exercises:  Exercise Performed Reps/ Time Weight/ Level Comments   Reassessment of objective measures and goals for discharge x 15'     Education and instruction on performance of HEP independently at gym  x 10'  Instructed pt in hip progression and provided pt with bands for resistance progressions; instructed and adjusted pt squat progression for independent LE loading   Upright Bike x 10'  Seat 3   Ankle ABC x 2x   A-Z   Ankle circles x 2x15  Cw/ccw   Ankle AROM x 2x10; 5\"     Clamshell  x30 LimeTB Bilat   SL hip abduction  3x10 LimeTB Bilat; pt hand on hip   Glute Bridge  2x10 Lime TB 3\" hold   Toe Yoga  2x10   Bilat   Foot doming  2x10, 5\"     Towel calf stretch          Seated BAPS  2x10 L1 PF/DF, inv/ev, cw/ccw   Tband ankle PF/DF  x40 Yellow TB    TB ankle inv/ev  x20 Yellow TB    Isometric inv/ev  10x5\"     Seated self DF stretch  4x30\"  Towel assist          Standing       Heel raise  3x10     3-way kick  2x10 ea     Total gym squat  x30  Lvl 18   Total gym single leg squat  x20  Lvl 18   Lateral band walk  4x10 steps [place] TB           (Held today)  Manual: MFR L lower lateral leg with pt in sidelying    Specific Instructions for next treatment: Continue with foot intrinsic and hip strength/endurance exercises.  End with vaso for